# Patient Record
Sex: MALE | Race: WHITE | NOT HISPANIC OR LATINO | Employment: FULL TIME | URBAN - METROPOLITAN AREA
[De-identification: names, ages, dates, MRNs, and addresses within clinical notes are randomized per-mention and may not be internally consistent; named-entity substitution may affect disease eponyms.]

---

## 2017-01-10 ENCOUNTER — HOSPITAL ENCOUNTER (OUTPATIENT)
Dept: INFUSION THERAPY | Facility: HOSPITAL | Age: 42
Discharge: HOME | End: 2017-01-10
Attending: FAMILY MEDICINE

## 2017-01-23 ENCOUNTER — HOSPITAL ENCOUNTER (OUTPATIENT)
Dept: INFUSION THERAPY | Facility: HOSPITAL | Age: 42
Discharge: HOME | End: 2017-01-23
Attending: FAMILY MEDICINE | Admitting: PSYCHIATRY & NEUROLOGY

## 2017-01-23 DIAGNOSIS — G62.9 NEUROPATHY: ICD-10-CM

## 2017-01-23 PROCEDURE — 95911 NRV CNDJ TEST 9-10 STUDIES: CPT | Performed by: PSYCHIATRY & NEUROLOGY

## 2017-01-23 PROCEDURE — 95911 NRV CNDJ TEST 9-10 STUDIES: CPT

## 2017-01-23 PROCEDURE — 95886 MUSC TEST DONE W/N TEST COMP: CPT

## 2017-01-23 PROCEDURE — 95886 MUSC TEST DONE W/N TEST COMP: CPT | Performed by: PSYCHIATRY & NEUROLOGY

## 2017-01-23 NOTE — PROCEDURES
EMG and Nerve Conduction Studies    Please see data sheets for details on methods, temperatures and lab standards. EMG muscles tested for upper extremity studies include the deltoid, biceps, triceps, pronator teres, extensor digitorum communis, first dorsal interosseous and abductor pollicis brevis.  EMG muscles tested for lower extremity studies include the vastus lateralis, tibialis anterior, peroneus longus, medial gastrocnemius and extensor digitorum brevis.  Additional muscles tested as needed.  Paraspinal muscles tested as needed.  The complete report includes the data sheets.    Indication: Sharp pains and numbness in the hands and feet.  Both sides are equal.  Mild back pain without radiating pain into the legs.  No substantial neck pain  History: 41-year-old male with the above symptoms      Ht:  Wt:   HbA1C: No results found for: HGBA1C  TSH: No results found for: TSH    Technical summary:  Nerve conduction studies were obtained in the left arm and left leg.  Skin temperatures were cool despite attempts to warm the hands.  Temperature correction was used as needed.  Needle examination was obtained on selected muscles of the left arm and left leg.    Results:  1.  Normal left median sensory study.  2.  Normal left ulnar sensory study with temperature correction.  3.  Normal left radial sensory study.  4.  Normal left median motor study.  5.  Normal left ulnar motor study.  6.  Normal left sural sensory study.  7.  Normal left superficial peroneal sensory study.  8.  Normal left peroneal motor study.  9.  Normal left tibial motor study.  10.  Needle examination of selected muscles of the left arm and left leg showed normal insertional activities throughout.  There were a few large motor units in the triceps but not more than 10% of the total were large.  Recruitment was normal.  All other muscles tested showed normal motor units and recruitment patterns.    Impression:  Normal study.  No evidence of a left  median or ulnar neuropathy peripheral polyneuropathy or a left cervical or lumbosacral radiculopathy by this study.  Study results were discussed with the patient.    Carlos Hodges M.D.

## 2023-01-31 ENCOUNTER — LAB (OUTPATIENT)
Dept: LAB | Facility: HOSPITAL | Age: 48
End: 2023-01-31
Payer: COMMERCIAL

## 2023-01-31 ENCOUNTER — TRANSCRIBE ORDERS (OUTPATIENT)
Dept: ADMINISTRATIVE | Facility: HOSPITAL | Age: 48
End: 2023-01-31
Payer: COMMERCIAL

## 2023-01-31 DIAGNOSIS — R19.7 DIARRHEA, UNSPECIFIED TYPE: Primary | ICD-10-CM
